# Patient Record
Sex: FEMALE | Race: WHITE | NOT HISPANIC OR LATINO | Employment: FULL TIME
[De-identification: names, ages, dates, MRNs, and addresses within clinical notes are randomized per-mention and may not be internally consistent; named-entity substitution may affect disease eponyms.]

---

## 2017-07-20 RX ORDER — SUMATRIPTAN 100 MG/1
TABLET, FILM COATED ORAL
Qty: 9 TABLET | Refills: 4 | Status: SHIPPED | OUTPATIENT
Start: 2017-07-20 | End: 2017-08-21

## 2017-07-20 RX ORDER — SUMATRIPTAN 100 MG/1
TABLET, FILM COATED ORAL
COMMUNITY
Start: 2006-12-21 | End: 2017-12-01

## 2017-08-21 RX ORDER — SUMATRIPTAN 100 MG/1
TABLET, FILM COATED ORAL
Qty: 9 TABLET | Refills: 1 | Status: SHIPPED | OUTPATIENT
Start: 2017-08-21 | End: 2017-11-06

## 2017-08-21 NOTE — TELEPHONE ENCOUNTER
[employer] is down for me- can not see last OV. Nothing documented in Epic.     Kristen Santiago, 08/21/17, 3:24 PM

## 2017-08-26 ENCOUNTER — HOSPITAL (OUTPATIENT)
Dept: OTHER | Age: 42
End: 2017-08-26
Attending: OBSTETRICS & GYNECOLOGY

## 2017-11-06 RX ORDER — SUMATRIPTAN 100 MG/1
TABLET, FILM COATED ORAL
Qty: 9 TABLET | Refills: 1 | Status: SHIPPED | OUTPATIENT
Start: 2017-11-06 | End: 2017-12-01

## 2017-11-08 NOTE — TELEPHONE ENCOUNTER
Physical made.    Your appointments     Date & Time Appointment Department [facility])    Nov 17, 2017 10:00 AM CST Physical - Established Patient with Terry Wellington MD [address] [place] [address]        Laura Albrecht

## 2017-12-01 ENCOUNTER — OFFICE VISIT (OUTPATIENT)
Dept: FAMILY MEDICINE CLINIC | Facility: CLINIC | Age: 42
End: 2017-12-01

## 2017-12-01 VITALS
HEIGHT: 63 IN | TEMPERATURE: 99 F | HEART RATE: 84 BPM | DIASTOLIC BLOOD PRESSURE: 62 MMHG | WEIGHT: 132 LBS | RESPIRATION RATE: 16 BRPM | SYSTOLIC BLOOD PRESSURE: 118 MMHG | BODY MASS INDEX: 23.39 KG/M2

## 2017-12-01 DIAGNOSIS — Z00.00 HEALTH CARE MAINTENANCE: Primary | ICD-10-CM

## 2017-12-01 DIAGNOSIS — G43.709 CHRONIC MIGRAINE WITHOUT AURA WITHOUT STATUS MIGRAINOSUS, NOT INTRACTABLE: ICD-10-CM

## 2017-12-01 PROCEDURE — 99396 PREV VISIT EST AGE 40-64: CPT | Performed by: FAMILY MEDICINE

## 2017-12-01 RX ORDER — SUMATRIPTAN 100 MG/1
TABLET, FILM COATED ORAL
Qty: 9 TABLET | Refills: 1 | Status: SHIPPED | OUTPATIENT
Start: 2017-12-01 | End: 2018-02-01

## 2017-12-01 NOTE — PATIENT INSTRUCTIONS
Schedule fasting labs, 12 hours water only  After labs  Get CT of head for eval of worsening migraines  If CT neg will begin metropolol daily for headache prevention  Refill meds

## 2017-12-02 NOTE — PROGRESS NOTES
[facility] [place]  PROGRESS NOTE  Chief Complaint:   Patient presents with:  Physical: no pap- sees gyne      HPI:   This is a 43year old female coming in for yearly physical.  Patient is not fasting today will come back for fasting labs.   The unfortunately she has resumed smoking a small amount again but this was prior to worsening of her headaches. Patient denies any chest pain patient denies any shortness of breath.   Patient denies any changes in the home environment they have had dogs for h stool.  MUSCULOSKELETAL:  Denies weakness, muscle aches, back pain, joint pain, swelling or stiffness. NEUROLOGICAL:  Denies  , seizures, dizziness, syncope, paralysis, , numbness or tingling in the extremities,change in bowel or bladder control.  SEE HPI normal reflexes. ASSESSMENT AND PLAN:   Felipe Barrera was seen today for physical.    Diagnoses and all orders for this visit:    Health care maintenance  -     COMP METABOLIC PANEL (14); Future  -     LIPID PANEL;  Future  -     CBC WITH DIFFERENTIAL WITH JAMILA complications, allergies, or worsening or changing symptoms. Patient is to call with any side effects or complications from the treatments as a result of today.      Problem List:  Patient Active Problem List:     Physical exam     Migraine      Elvia Hoar

## 2017-12-11 ENCOUNTER — LABORATORY ENCOUNTER (OUTPATIENT)
Dept: LAB | Age: 42
End: 2017-12-11
Attending: FAMILY MEDICINE
Payer: COMMERCIAL

## 2017-12-11 DIAGNOSIS — G43.709 CHRONIC MIGRAINE WITHOUT AURA WITHOUT STATUS MIGRAINOSUS, NOT INTRACTABLE: ICD-10-CM

## 2017-12-11 DIAGNOSIS — Z00.00 HEALTH CARE MAINTENANCE: ICD-10-CM

## 2017-12-11 PROCEDURE — 85025 COMPLETE CBC W/AUTO DIFF WBC: CPT

## 2017-12-11 PROCEDURE — 80061 LIPID PANEL: CPT

## 2017-12-11 PROCEDURE — 86140 C-REACTIVE PROTEIN: CPT

## 2017-12-11 PROCEDURE — 80053 COMPREHEN METABOLIC PANEL: CPT

## 2017-12-11 PROCEDURE — 36415 COLL VENOUS BLD VENIPUNCTURE: CPT

## 2017-12-11 PROCEDURE — 84443 ASSAY THYROID STIM HORMONE: CPT

## 2017-12-12 ENCOUNTER — TELEPHONE (OUTPATIENT)
Dept: FAMILY MEDICINE CLINIC | Facility: CLINIC | Age: 42
End: 2017-12-12

## 2017-12-12 NOTE — TELEPHONE ENCOUNTER
----- Message from Debo Berg MD sent at 12/11/2017  7:21 PM CST -----  Labs all good  No explanation for worsening of migraines over past few months  Recommend proceeding with CT scan of head, see if PA done, order had been placed   Will need labs forw

## 2017-12-13 ENCOUNTER — TELEPHONE (OUTPATIENT)
Dept: FAMILY MEDICINE CLINIC | Facility: CLINIC | Age: 42
End: 2017-12-13

## 2017-12-13 NOTE — TELEPHONE ENCOUNTER
Patient informed of the below results and recommendations. [address] [facility] [facility] is in her network so she will contact them to schedule an appt.   Order and labs faxed to [facility].

## 2017-12-18 ENCOUNTER — TELEPHONE (OUTPATIENT)
Dept: FAMILY MEDICINE CLINIC | Facility: CLINIC | Age: 42
End: 2017-12-18

## 2017-12-18 NOTE — TELEPHONE ENCOUNTER
Dr. Rosalinda Whitney wondering if pt had CT of brain done yet/has it scheduled. Left message for pt to return call.

## 2017-12-20 ENCOUNTER — HOSPITAL (OUTPATIENT)
Dept: OTHER | Age: 42
End: 2017-12-20
Attending: FAMILY MEDICINE

## 2017-12-20 NOTE — TELEPHONE ENCOUNTER
Call back from [facility]- pt did not have test done there.   Called pt- had CT done at [facility], will have to have call placed in AM.

## 2017-12-21 RX ORDER — METOPROLOL SUCCINATE 25 MG/1
12.5 TABLET, EXTENDED RELEASE ORAL DAILY
Qty: 30 TABLET | Refills: 6 | Status: SHIPPED | OUTPATIENT
Start: 2017-12-21 | End: 2018-12-07

## 2017-12-21 NOTE — TELEPHONE ENCOUNTER
Received results from [facility]. Per Dr. Courtney Potter is normal, recommend starting metoprolol 12.5 mg daily to decrease frequency of headaches. \"    Pt informed, [facility]  Unable to select Metoprolol Succinate 1

## 2018-02-01 RX ORDER — SUMATRIPTAN 100 MG/1
TABLET, FILM COATED ORAL
Qty: 9 TABLET | Refills: 1 | Status: SHIPPED | OUTPATIENT
Start: 2018-02-01 | End: 2018-07-12

## 2018-02-01 NOTE — TELEPHONE ENCOUNTER
Please advise refill. Received a faxed refill request from [facility] for sumatriptan 100mg. Last Rx 12/1/17.     Future appt:  None  Last Appointment:  12/1/2017    Cholesterol, Total (mg/dL)   Date Value   12/11/2017 147   ----------  HDL Choles

## 2018-07-12 RX ORDER — SUMATRIPTAN 100 MG/1
TABLET, FILM COATED ORAL
Qty: 9 TABLET | Refills: 1 | Status: SHIPPED | OUTPATIENT
Start: 2018-07-12 | End: 2018-12-07

## 2018-07-12 NOTE — TELEPHONE ENCOUNTER
Please advise refill of sumatriptan 100mg.   Last Rx: 2/1/18    Future appt:  None  Last Appointment:  12/1/17 - no f/u noted    Cholesterol, Total (mg/dL)   Date Value   12/11/2017 147   ----------  HDL Cholesterol (mg/dL)   Date Value   12/11/2017 52   --

## 2018-08-10 RX ORDER — SUMATRIPTAN 100 MG/1
TABLET, FILM COATED ORAL
Qty: 9 TABLET | Refills: 1 | Status: SHIPPED | OUTPATIENT
Start: 2018-08-10 | End: 2018-11-16

## 2018-08-10 NOTE — TELEPHONE ENCOUNTER
Please advise refill of Sumatriptan 100mg.   Last Rx: 7/12/18    Future appt:  None  Last Appointment:  12/1/17 for Physical, no f/u noted    Cholesterol, Total (mg/dL)   Date Value   12/11/2017 147   ----------  HDL Cholesterol (mg/dL)   Date Value   12/11

## 2018-10-08 ENCOUNTER — HOSPITAL (OUTPATIENT)
Dept: OTHER | Age: 43
End: 2018-10-08
Attending: OBSTETRICS & GYNECOLOGY

## 2018-10-14 ENCOUNTER — HOSPITAL (OUTPATIENT)
Dept: OTHER | Age: 43
End: 2018-10-14
Attending: EMERGENCY MEDICINE

## 2018-11-16 ENCOUNTER — TELEPHONE (OUTPATIENT)
Dept: FAMILY MEDICINE CLINIC | Facility: CLINIC | Age: 43
End: 2018-11-16

## 2018-11-16 RX ORDER — SUMATRIPTAN 100 MG/1
TABLET, FILM COATED ORAL
Qty: 9 TABLET | Refills: 1 | Status: SHIPPED | OUTPATIENT
Start: 2018-11-16 | End: 2018-12-07

## 2018-11-16 NOTE — TELEPHONE ENCOUNTER
Please advise refill of Sumatriptan 100mg. Last Rx: 8/10/18    Future appt:     Your appointments     Date & Time Appointment Department [facility])    Dec 07, 2018  2:00 PM CST Physical - Established Patient with Yolanda Rocha MD [address] [facility]

## 2018-12-07 ENCOUNTER — OFFICE VISIT (OUTPATIENT)
Dept: FAMILY MEDICINE CLINIC | Facility: CLINIC | Age: 43
End: 2018-12-07
Payer: COMMERCIAL

## 2018-12-07 VITALS
BODY MASS INDEX: 25.12 KG/M2 | TEMPERATURE: 98 F | DIASTOLIC BLOOD PRESSURE: 68 MMHG | WEIGHT: 140 LBS | HEIGHT: 62.75 IN | RESPIRATION RATE: 18 BRPM | SYSTOLIC BLOOD PRESSURE: 118 MMHG

## 2018-12-07 DIAGNOSIS — G43.709 CHRONIC MIGRAINE WITHOUT AURA WITHOUT STATUS MIGRAINOSUS, NOT INTRACTABLE: ICD-10-CM

## 2018-12-07 DIAGNOSIS — Z00.00 PHYSICAL EXAM: Primary | ICD-10-CM

## 2018-12-07 DIAGNOSIS — R63.5 WEIGHT GAIN: ICD-10-CM

## 2018-12-07 PROCEDURE — 99396 PREV VISIT EST AGE 40-64: CPT | Performed by: FAMILY MEDICINE

## 2018-12-07 PROCEDURE — 90715 TDAP VACCINE 7 YRS/> IM: CPT | Performed by: FAMILY MEDICINE

## 2018-12-07 PROCEDURE — 90471 IMMUNIZATION ADMIN: CPT | Performed by: FAMILY MEDICINE

## 2018-12-07 RX ORDER — MIRABEGRON 50 MG/1
TABLET, FILM COATED, EXTENDED RELEASE ORAL
COMMUNITY
Start: 2018-10-23 | End: 2019-12-11 | Stop reason: ALTCHOICE

## 2018-12-07 RX ORDER — SUMATRIPTAN 100 MG/1
TABLET, FILM COATED ORAL
Qty: 9 TABLET | Refills: 2 | Status: SHIPPED | OUTPATIENT
Start: 2018-12-07 | End: 2019-07-08

## 2018-12-10 NOTE — PROGRESS NOTES
[facility] [place]  PROGRESS NOTE  Chief Complaint:   Patient presents with:  Physical      HPI:   This is a 37year old female coming in for yearly physical.  Patient is pleased that she has been able to quit smoking this year but is displeased C-REACTIVE PROTEIN   Result Value Ref Range    C-Reactive Protein <0.29 <1.00 mg/dL   CBC W/ DIFFERENTIAL   Result Value Ref Range    WBC 6.7 4.0 - 13.0 x10(3) uL    RBC 4.11 3.80 - 5.10 x10(6)uL    HGB 13.1 12.0 - 16.0 g/dL    HCT 39.9 34.0 - 50.0 % Answered  Comment: 5-10 cigarrettes per day        REVIEW OF SYSTEMS:   CONSTITUTIONAL:  Denies  , fever, chills, or fatigue. EENT:  Eyes:  Denies eye pain, visual loss, blurred vision, double vision or yellow sclerae.  Ears, Nose, Throat:  Denies hearing patent, no nasal discharge Throat:  No tonsillar erythema or exudate. Mouth:  No oral lesions or ulcerations, good dentition. NECK: Supple, , no JVD, no thyromegaly. SKIN: No rashes, no skin lesion, no bruising, good turgor.   HEART:  Regular rate and rh education done. Outcome: Patient verbalizes understanding. Patient is notified to call with any questions, complications, allergies, or worsening or changing symptoms.   Patient is to call with any side effects or complications from the treatments as a re

## 2018-12-17 ENCOUNTER — LABORATORY ENCOUNTER (OUTPATIENT)
Dept: LAB | Age: 43
End: 2018-12-17
Attending: FAMILY MEDICINE
Payer: COMMERCIAL

## 2018-12-17 DIAGNOSIS — Z00.00 PHYSICAL EXAM: ICD-10-CM

## 2018-12-17 DIAGNOSIS — R63.5 WEIGHT GAIN: ICD-10-CM

## 2018-12-17 PROCEDURE — 80053 COMPREHEN METABOLIC PANEL: CPT

## 2018-12-17 PROCEDURE — 84443 ASSAY THYROID STIM HORMONE: CPT

## 2018-12-17 PROCEDURE — 84439 ASSAY OF FREE THYROXINE: CPT

## 2018-12-17 PROCEDURE — 85025 COMPLETE CBC W/AUTO DIFF WBC: CPT

## 2018-12-17 PROCEDURE — 36415 COLL VENOUS BLD VENIPUNCTURE: CPT

## 2018-12-17 PROCEDURE — 80061 LIPID PANEL: CPT

## 2018-12-18 ENCOUNTER — TELEPHONE (OUTPATIENT)
Dept: FAMILY MEDICINE CLINIC | Facility: CLINIC | Age: 43
End: 2018-12-18

## 2018-12-18 NOTE — TELEPHONE ENCOUNTER
----- Message from Debo Berg MD sent at 12/17/2018  7:57 PM CST -----  Labs all very good including chol, sugar, liver and thyroid studies

## 2019-04-12 ENCOUNTER — HOSPITAL (OUTPATIENT)
Dept: OTHER | Age: 44
End: 2019-04-12
Attending: OBSTETRICS & GYNECOLOGY

## 2019-05-02 ENCOUNTER — HOSPITAL (OUTPATIENT)
Dept: OTHER | Age: 44
End: 2019-05-02
Attending: OBSTETRICS & GYNECOLOGY

## 2019-06-06 ENCOUNTER — HOSPITAL (OUTPATIENT)
Dept: OTHER | Age: 44
End: 2019-06-06
Attending: OBSTETRICS & GYNECOLOGY

## 2019-07-08 RX ORDER — SUMATRIPTAN 100 MG/1
TABLET, FILM COATED ORAL
Qty: 9 TABLET | Refills: 2 | Status: SHIPPED | OUTPATIENT
Start: 2019-07-08 | End: 2019-07-15

## 2019-07-08 NOTE — TELEPHONE ENCOUNTER
Received a fax requesting refill for sumatriptan.     Future appt:    Last Appointment with Provider:  12/7/2018    Last Appointment at [facility]:  Visit date not found    Lab Results   Component Value Date     12/17/2018    K 3.8 12/17/2018    BUN

## 2019-07-15 RX ORDER — SUMATRIPTAN 100 MG/1
TABLET, FILM COATED ORAL
Qty: 9 TABLET | Refills: 2 | Status: SHIPPED | OUTPATIENT
Start: 2019-07-15 | End: 2019-12-10

## 2019-07-15 NOTE — TELEPHONE ENCOUNTER
Future appt:    Last Appointment:   Last px on file: 12/17/18     Sumatriptan refilled on 7/8/19    Received request from [address] Spoke with pt , confirmed that they are now using mail order pharmacy.       Cholesterol, Total (mg/dL)   Date

## 2019-12-10 RX ORDER — SUMATRIPTAN 100 MG/1
TABLET, FILM COATED ORAL
Qty: 9 TABLET | Refills: 2 | Status: SHIPPED | OUTPATIENT
Start: 2019-12-10 | End: 2020-03-16

## 2019-12-10 NOTE — TELEPHONE ENCOUNTER
Future appt:     Your appointments     Date & Time Appointment Department [facility])    Dec 11, 2019  4:00 PM CST Physical - Established with Israel Moise, [facility] ([facility])            Reyes Blank

## 2019-12-11 ENCOUNTER — HOSPITAL (OUTPATIENT)
Dept: OTHER | Age: 44
End: 2019-12-11
Attending: OBSTETRICS & GYNECOLOGY

## 2019-12-11 ENCOUNTER — OFFICE VISIT (OUTPATIENT)
Dept: FAMILY MEDICINE CLINIC | Facility: CLINIC | Age: 44
End: 2019-12-11
Payer: COMMERCIAL

## 2019-12-11 VITALS
TEMPERATURE: 97 F | WEIGHT: 137 LBS | BODY MASS INDEX: 24.58 KG/M2 | DIASTOLIC BLOOD PRESSURE: 66 MMHG | SYSTOLIC BLOOD PRESSURE: 106 MMHG | RESPIRATION RATE: 18 BRPM | HEART RATE: 67 BPM | HEIGHT: 62.75 IN

## 2019-12-11 DIAGNOSIS — Z13.0 SCREENING FOR DEFICIENCY ANEMIA: ICD-10-CM

## 2019-12-11 DIAGNOSIS — Z13.220 LIPID SCREENING: ICD-10-CM

## 2019-12-11 DIAGNOSIS — Z00.00 WELLNESS EXAMINATION: Primary | ICD-10-CM

## 2019-12-11 DIAGNOSIS — Z13.29 THYROID DISORDER SCREEN: ICD-10-CM

## 2019-12-11 PROCEDURE — 99396 PREV VISIT EST AGE 40-64: CPT | Performed by: FAMILY MEDICINE

## 2019-12-11 NOTE — PATIENT INSTRUCTIONS
Continue current medications  Migraine stable with medications. Pap smear and mammogram done recently at [facility].   Recommend fasting labs at [facility].   Return to clinic if any concern.

## 2019-12-11 NOTE — PROGRESS NOTES
[address] [place] [address]    Chief Complaint:   Patient presents with:  Physical      HPI:     Martha Bassett is a 40year old female who presents for an Annual Health Visit.     Patient has history of migraine which is currently stable with medic frequency; no hx abnormal pap smear; no vaginal discharge; periods regular; no urinary incontinence  MUSCULOSKELETAL: no joint complaints upper or lower extremities  NEURO: no sensory or motor complaint  PSYCHE: no symptoms of depression or anxiety  HEMATO REFLEX TO FREE T4    Lipid screening  -     LIPID PANEL    Screening for deficiency anemia  -     CBC WITH DIFFERENTIAL WITH PLATELET    Thyroid disorder screen  -     TSH W REFLEX TO FREE T4        Patient Instructions   Continue current medications  Migr

## 2019-12-18 ENCOUNTER — TELEPHONE (OUTPATIENT)
Dept: FAMILY MEDICINE CLINIC | Facility: CLINIC | Age: 44
End: 2019-12-18

## 2019-12-18 NOTE — TELEPHONE ENCOUNTER
----- Message from Yash Diop MD sent at 12/17/2019  8:28 PM CST -----  Please inform patient that her LDL cholesterol is very slightly elevated at 104.   Rest of her lipid panel and rest of labs are normal.  Recommend low-cholesterol diet and exercise a

## 2020-03-16 RX ORDER — SUMATRIPTAN 100 MG/1
TABLET, FILM COATED ORAL
Qty: 9 TABLET | Refills: 0 | Status: SHIPPED | OUTPATIENT
Start: 2020-03-16 | End: 2020-05-07

## 2020-03-16 NOTE — TELEPHONE ENCOUNTER
Future appt:    Last Appointment with provider:   12/11/2019  Last appointment at [facility]:  12/11/2019  CHOLESTEROL, TOTAL (mg/dL)   Date Value   12/16/2019 170     HDL CHOLESTEROL (mg/dL)   Date Value   12/16/2019 51     LDL-CHOLESTEROL (mg/dL (calc)

## 2020-05-07 RX ORDER — SUMATRIPTAN 100 MG/1
TABLET, FILM COATED ORAL
Qty: 9 TABLET | Refills: 2 | Status: SHIPPED | OUTPATIENT
Start: 2020-05-07 | End: 2020-08-28

## 2020-05-07 NOTE — TELEPHONE ENCOUNTER
Future appt:    Last Appointment with provider:   12/11/2019  Last appointment at [facility]:  12/11/2019  CHOLESTEROL, TOTAL (mg/dL)   Date Value   12/16/2019 170     HDL CHOLESTEROL (mg/dL)   Date Value   12/16/2019 51     LDL-CHOLESTEROL (mg/dL (calc)

## 2020-08-28 RX ORDER — SUMATRIPTAN 100 MG/1
TABLET, FILM COATED ORAL
Qty: 9 TABLET | Refills: 1 | Status: SHIPPED | OUTPATIENT
Start: 2020-08-28 | End: 2020-11-05

## 2020-08-28 NOTE — TELEPHONE ENCOUNTER
Future appt:    Last Appointment with provider:   Visit date not found  Last appointment at [facility]:  Visit date not found  CHOLESTEROL, TOTAL (mg/dL)   Date Value   12/16/2019 170     HDL CHOLESTEROL (mg/dL)   Date Value   12/16/2019 51     LDL-TARUN

## 2020-10-29 ENCOUNTER — HOSPITAL (OUTPATIENT)
Dept: OTHER | Age: 45
End: 2020-10-29
Attending: OBSTETRICS & GYNECOLOGY

## 2020-11-05 RX ORDER — SUMATRIPTAN 100 MG/1
TABLET, FILM COATED ORAL
Qty: 9 TABLET | Refills: 0 | Status: SHIPPED | OUTPATIENT
Start: 2020-11-05 | End: 2020-11-24

## 2020-11-05 NOTE — TELEPHONE ENCOUNTER
Pt is due for appointment. Patient is due for labs:     Please have pt  schedule apptointment according to my follow up guidelines. Refills until appointmentt ok.

## 2020-11-05 NOTE — TELEPHONE ENCOUNTER
Please advise refill of Sumatriptan 100mg.   Last Rx: 8/28/20    Future appt:    Last Appointment with provider:   12/11/19 - Physical per notes return for Wellness visit    Last appointment at [facility]:  Visit date not found  CHOLESTEROL, TOTAL (mg/dL)

## 2020-11-24 RX ORDER — SUMATRIPTAN 100 MG/1
TABLET, FILM COATED ORAL
Qty: 9 TABLET | Refills: 0 | Status: SHIPPED | OUTPATIENT
Start: 2020-11-24 | End: 2020-12-02

## 2020-11-25 NOTE — TELEPHONE ENCOUNTER
Future appt:     Your appointments     Date & Time Appointment Department [facility])    Dec 17, 2020 11:00 AM CST Physical - Established with Claudell Public, [address] [place] [address] Venkatesh Davalos ([place])

## 2020-12-02 RX ORDER — SUMATRIPTAN 100 MG/1
TABLET, FILM COATED ORAL
Qty: 9 TABLET | Refills: 0 | Status: SHIPPED | OUTPATIENT
Start: 2020-12-02 | End: 2020-12-17

## 2020-12-02 NOTE — TELEPHONE ENCOUNTER
Future appt:     Your appointments     Date & Time Appointment Department [facility])    Dec 17, 2020 11:00 AM CST Physical - Established with Dori Krueger, [address] [facility] [place] ([facility]            Janice Hart

## 2020-12-17 ENCOUNTER — HOSPITAL ENCOUNTER (OUTPATIENT)
Dept: MAMMOGRAPHY | Age: 45
Discharge: HOME | End: 2020-12-17
Attending: FAMILY MEDICINE

## 2020-12-17 ENCOUNTER — OFFICE VISIT (OUTPATIENT)
Dept: FAMILY MEDICINE CLINIC | Facility: CLINIC | Age: 45
End: 2020-12-17
Payer: COMMERCIAL

## 2020-12-17 VITALS
TEMPERATURE: 99 F | BODY MASS INDEX: 24.53 KG/M2 | RESPIRATION RATE: 16 BRPM | HEIGHT: 62.27 IN | DIASTOLIC BLOOD PRESSURE: 58 MMHG | OXYGEN SATURATION: 98 % | SYSTOLIC BLOOD PRESSURE: 110 MMHG | HEART RATE: 64 BPM | WEIGHT: 135 LBS

## 2020-12-17 DIAGNOSIS — Z00.00 PHYSICAL EXAM: Primary | ICD-10-CM

## 2020-12-17 DIAGNOSIS — Z12.31 OTHER SCREENING MAMMOGRAM: ICD-10-CM

## 2020-12-17 PROCEDURE — 3008F BODY MASS INDEX DOCD: CPT | Performed by: FAMILY MEDICINE

## 2020-12-17 PROCEDURE — 3078F DIAST BP <80 MM HG: CPT | Performed by: FAMILY MEDICINE

## 2020-12-17 PROCEDURE — 3074F SYST BP LT 130 MM HG: CPT | Performed by: FAMILY MEDICINE

## 2020-12-17 PROCEDURE — 77067 SCR MAMMO BI INCL CAD: CPT

## 2020-12-17 PROCEDURE — 99396 PREV VISIT EST AGE 40-64: CPT | Performed by: FAMILY MEDICINE

## 2020-12-17 RX ORDER — SUMATRIPTAN 100 MG/1
TABLET, FILM COATED ORAL
Qty: 9 TABLET | Refills: 5 | Status: SHIPPED | OUTPATIENT
Start: 2020-12-17 | End: 2021-07-17

## 2020-12-17 NOTE — PATIENT INSTRUCTIONS
Continue current medications for migraine. Declined flu shot.    Had mammogram done at [facility].   Fasting labs at [facility].

## 2020-12-18 NOTE — PROGRESS NOTES
[address]    Chief Complaint:   Patient presents with:  Physical: yearly      HPI:     Aline Bennett is a 39year old female who presents for an Annual Health Visit.     Patient had mammogram done today, also had Pap smear done co periods regular; no urinary incontinence  MUSCULOSKELETAL: no joint complaints upper or lower extremities  NEURO: no sensory or motor complaint  PSYCHE: no symptoms of depression or anxiety  HEMATOLOGY: denies hx anemia; denies bruising or excessive bleedi SUMAtriptan Succinate 100 MG Oral Tab; TAKE 1 TABLET AT ONSET OF MIGRAINE, MAY REPEAT AFTER 2 HOURS. NOT TO EXCEED 2 DOSES IN 24 HOURS        Patient Instructions   Continue current medications for migraine. Declined flu shot.    Had mammogram done at

## 2021-01-09 ENCOUNTER — TELEPHONE (OUTPATIENT)
Dept: FAMILY MEDICINE CLINIC | Facility: CLINIC | Age: 46
End: 2021-01-09

## 2021-01-09 DIAGNOSIS — R31.9 HEMATURIA, UNSPECIFIED TYPE: Primary | ICD-10-CM

## 2021-01-09 LAB
ABSOLUTE BASOPHILS: 41 CELLS/UL (ref 0–200)
ABSOLUTE EOSINOPHILS: 183 CELLS/UL (ref 15–500)
ABSOLUTE LYMPHOCYTES: 1711 CELLS/UL (ref 850–3900)
ABSOLUTE MONOCYTES: 578 CELLS/UL (ref 200–950)
ABSOLUTE NEUTROPHILS: 3387 CELLS/UL (ref 1500–7800)
ALBUMIN/GLOBULIN RATIO: 1.9 (CALC) (ref 1–2.5)
ALBUMIN: 4.5 G/DL (ref 3.6–5.1)
ALKALINE PHOSPHATASE: 58 U/L (ref 31–125)
ALT: 28 U/L (ref 6–29)
APPEARANCE: CLEAR
AST: 20 U/L (ref 10–35)
BASOPHILS: 0.7 %
BILIRUBIN, TOTAL: 0.5 MG/DL (ref 0.2–1.2)
BILIRUBIN: NEGATIVE
BUN: 18 MG/DL (ref 7–25)
CALCIUM: 9.6 MG/DL (ref 8.6–10.2)
CARBON DIOXIDE: 27 MMOL/L (ref 20–32)
CHLORIDE: 106 MMOL/L (ref 98–110)
CHOL/HDLC RATIO: 3.2 (CALC)
CHOLESTEROL, TOTAL: 178 MG/DL
CREATININE: 0.69 MG/DL (ref 0.5–1.1)
EGFR IF AFRICN AM: 122 ML/MIN/1.73M2
EGFR IF NONAFRICN AM: 105 ML/MIN/1.73M2
EOSINOPHILS: 3.1 %
GLOBULIN: 2.4 G/DL (CALC) (ref 1.9–3.7)
GLUCOSE: 92 MG/DL (ref 65–99)
GLUCOSE: NEGATIVE
HDL CHOLESTEROL: 56 MG/DL
HEMATOCRIT: 38.5 % (ref 35–45)
HEMOGLOBIN A1C: 4.9 % OF TOTAL HGB
HEMOGLOBIN: 13.1 G/DL (ref 11.7–15.5)
KETONES: NEGATIVE
LDL-CHOLESTEROL: 107 MG/DL (CALC)
LEUKOCYTE ESTERASE: NEGATIVE
LYMPHOCYTES: 29 %
MCH: 32 PG (ref 27–33)
MCHC: 34 G/DL (ref 32–36)
MCV: 94.1 FL (ref 80–100)
MONOCYTES: 9.8 %
MPV: 9.9 FL (ref 7.5–12.5)
NEUTROPHILS: 57.4 %
NITRITE: NEGATIVE
NON-HDL CHOLESTEROL: 122 MG/DL (CALC)
PH: 7 (ref 5–8)
PLATELET COUNT: 276 THOUSAND/UL (ref 140–400)
POTASSIUM: 4.3 MMOL/L (ref 3.5–5.3)
PROTEIN, TOTAL: 6.9 G/DL (ref 6.1–8.1)
PROTEIN: NEGATIVE
RDW: 11.5 % (ref 11–15)
RED BLOOD CELL COUNT: 4.09 MILLION/UL (ref 3.8–5.1)
SODIUM: 139 MMOL/L (ref 135–146)
SPECIFIC GRAVITY: 1.02 (ref 1–1.03)
TRIGLYCERIDES: 66 MG/DL
TSH W/REFLEX TO FT4: 0.77 MIU/L
WHITE BLOOD CELL COUNT: 5.9 THOUSAND/UL (ref 3.8–10.8)

## 2021-01-09 NOTE — TELEPHONE ENCOUNTER
----- Message from Johanny Light MD sent at 1/9/2021 10:06 AM CST -----  Please inform patient that her lipid panel is normal except slightly elevated LDL cholesterol at 107. Her urinalysis also shows that she has microscopic blood in her urine.   No sign

## 2021-01-09 NOTE — TELEPHONE ENCOUNTER
Patient notified of lab results and Dr. Sanju Larios instructions.  Lab order for repeat u/a faxed to [facility].

## 2021-01-13 ENCOUNTER — HOSPITAL ENCOUNTER (OUTPATIENT)
Dept: ULTRASOUND IMAGING | Age: 46
Discharge: HOME | End: 2021-01-13
Attending: OBSTETRICS & GYNECOLOGY

## 2021-01-13 ENCOUNTER — HOSPITAL ENCOUNTER (OUTPATIENT)
Dept: MAMMOGRAPHY | Age: 46
Discharge: HOME | End: 2021-01-13
Attending: OBSTETRICS & GYNECOLOGY

## 2021-01-13 DIAGNOSIS — R92.8 ABNORMAL MAMMOGRAM: ICD-10-CM

## 2021-01-13 PROCEDURE — 76641 ULTRASOUND BREAST COMPLETE: CPT

## 2021-01-13 PROCEDURE — G0279 TOMOSYNTHESIS, MAMMO: HCPCS

## 2021-01-29 ENCOUNTER — TELEPHONE (OUTPATIENT)
Dept: CT IMAGING | Age: 46
End: 2021-01-29

## 2021-02-01 ENCOUNTER — HOSPITAL ENCOUNTER (OUTPATIENT)
Dept: MAMMOGRAPHY | Age: 46
Discharge: HOME | End: 2021-02-01
Attending: OBSTETRICS & GYNECOLOGY

## 2021-02-01 VITALS — OXYGEN SATURATION: 98 % | HEART RATE: 77 BPM | DIASTOLIC BLOOD PRESSURE: 56 MMHG | SYSTOLIC BLOOD PRESSURE: 114 MMHG

## 2021-02-01 DIAGNOSIS — R92.8 ABNORMAL FINDING ON BREAST IMAGING: ICD-10-CM

## 2021-02-01 DIAGNOSIS — R92.0 MAMMOGRAPHIC MICROCALCIFICATION: ICD-10-CM

## 2021-02-01 PROCEDURE — 10002801 HB RX 250 W/O HCPCS

## 2021-02-01 PROCEDURE — A4648 IMPLANTABLE TISSUE MARKER: HCPCS

## 2021-02-01 PROCEDURE — 19081 BX BREAST 1ST LESION STRTCTC: CPT

## 2021-02-01 PROCEDURE — 10006023 HB SUPPLY 272

## 2021-02-01 PROCEDURE — 88305 TISSUE EXAM BY PATHOLOGIST: CPT | Performed by: OBSTETRICS & GYNECOLOGY

## 2021-02-01 PROCEDURE — 10006027 HB SUPPLY 278

## 2021-02-01 RX ORDER — LIDOCAINE HYDROCHLORIDE 20 MG/ML
INJECTION, SOLUTION INFILTRATION; PERINEURAL PRN
Status: COMPLETED | OUTPATIENT
Start: 2021-02-01 | End: 2021-02-01

## 2021-02-01 RX ADMIN — LIDOCAINE HYDROCHLORIDE 10 ML: 20 INJECTION, SOLUTION INFILTRATION; PERINEURAL at 13:43

## 2021-02-02 ENCOUNTER — TELEPHONE (OUTPATIENT)
Dept: MAMMOGRAPHY | Age: 46
End: 2021-02-02

## 2021-02-03 LAB
ASR DISCLAIMER: NORMAL
CASE RPRT: NORMAL
CLINICAL INFO: NORMAL
PATH REPORT.FINAL DX SPEC: NORMAL
PATH REPORT.FINAL DX SPEC: NORMAL
PATH REPORT.GROSS SPEC: NORMAL

## 2021-02-11 ENCOUNTER — TELEPHONE (OUTPATIENT)
Dept: FAMILY MEDICINE CLINIC | Facility: CLINIC | Age: 46
End: 2021-02-11

## 2021-02-11 LAB
APPEARANCE: CLEAR
BILIRUBIN: NEGATIVE
COLOR: YELLOW
GLUCOSE: NEGATIVE
KETONES: NEGATIVE
LEUKOCYTE ESTERASE: NEGATIVE
NITRITE: NEGATIVE
OCCULT BLOOD: NEGATIVE
PH: 7.5 (ref 5–8)
PROTEIN: NEGATIVE
SPECIFIC GRAVITY: 1.01 (ref 1–1.03)

## 2021-02-11 NOTE — TELEPHONE ENCOUNTER
----- Message from Faby Hu MD sent at 2/11/2021 11:58 AM CST -----  Please inform patient that her urine analysis is normal, her hematuria has improved. Recommend to continue with plenty of fluid.

## 2021-02-11 NOTE — TELEPHONE ENCOUNTER
Pt was informed of the below results. Pt states that she understands.  Pt does not have any other question at this time

## 2021-03-03 ENCOUNTER — TELEPHONE (OUTPATIENT)
Dept: FAMILY MEDICINE CLINIC | Facility: CLINIC | Age: 46
End: 2021-03-03

## 2021-03-30 ENCOUNTER — OFFICE VISIT (OUTPATIENT)
Dept: FAMILY MEDICINE CLINIC | Facility: CLINIC | Age: 46
End: 2021-03-30
Payer: COMMERCIAL

## 2021-03-30 VITALS
WEIGHT: 139.38 LBS | DIASTOLIC BLOOD PRESSURE: 62 MMHG | TEMPERATURE: 97 F | BODY MASS INDEX: 23.79 KG/M2 | OXYGEN SATURATION: 99 % | HEIGHT: 64 IN | RESPIRATION RATE: 18 BRPM | HEART RATE: 71 BPM | SYSTOLIC BLOOD PRESSURE: 118 MMHG

## 2021-03-30 DIAGNOSIS — Z01.818 PREOPERATIVE EXAMINATION: Primary | ICD-10-CM

## 2021-03-30 DIAGNOSIS — N39.3 STRESS INCONTINENCE OF URINE: ICD-10-CM

## 2021-03-30 DIAGNOSIS — L30.9 DERMATITIS: ICD-10-CM

## 2021-03-30 PROCEDURE — 99214 OFFICE O/P EST MOD 30 MIN: CPT | Performed by: FAMILY MEDICINE

## 2021-03-30 PROCEDURE — 3074F SYST BP LT 130 MM HG: CPT | Performed by: FAMILY MEDICINE

## 2021-03-30 PROCEDURE — 3008F BODY MASS INDEX DOCD: CPT | Performed by: FAMILY MEDICINE

## 2021-03-30 PROCEDURE — 3078F DIAST BP <80 MM HG: CPT | Performed by: FAMILY MEDICINE

## 2021-03-30 PROCEDURE — 93000 ELECTROCARDIOGRAM COMPLETE: CPT | Performed by: FAMILY MEDICINE

## 2021-03-30 RX ORDER — TRIAMCINOLONE ACETONIDE 0.1 %
1 PASTE (GRAM) DENTAL 2 TIMES DAILY
Qty: 1 TUBE | Refills: 1 | Status: SHIPPED | OUTPATIENT
Start: 2021-03-30

## 2021-03-30 RX ORDER — SUMATRIPTAN 100 MG/1
100 TABLET, FILM COATED ORAL PRN
COMMUNITY

## 2021-03-30 NOTE — PROGRESS NOTES
[facility] [place]  PRE-OP NOTE    Chief Complaint:   Patient presents with:  Pre-Op Exam      HPI:   Medina Lott is a 39year old female with a hx of urine stress incontinence, who presents for a pre-operative physical exam. Patient is to Eyes:  Denies eye pain, visual loss, blurred vision, double vision or yellow sclerae. Ears, Nose, Throat:  Denies hearing loss, sneezing, congestion, runny nose or sore throat.   INTEGUMENTARY:   See HPI  CARDIOVASCULAR:  Denies chest pain, chest pressure, erythema or exudate. Mouth:  No oral lesions or ulcerations, good dentition. NECK: Supple, no CLAD, no JVD, no carotid bruit, no thyromegaly.   SKIN: Small patch of erythematous, slightly raised rash over bottom of lower lip, nontender, no drainage, no wa verbalizes understanding. Patient is notified to call with any questions, complications, allergies, or worsening or changing symptoms. Patient is to call with any side effects or complications from the treatments as a result of today.      Problem List:  P

## 2021-03-30 NOTE — PATIENT INSTRUCTIONS
EKG shows sinus bradycardia. Labs done at [address] [place] [address].  After today's assessment  patient is at optimum health for surgery and relatively at low risk. There are no contraindication for procedure.    Recommend to avoid any use of aleve, aspirin or ibuprofen 1 wee

## 2021-04-02 ENCOUNTER — LAB SERVICES (OUTPATIENT)
Dept: LAB | Age: 46
End: 2021-04-02
Attending: UROLOGY

## 2021-04-02 DIAGNOSIS — Z01.812 PRE-PROCEDURAL LABORATORY EXAMINATION: ICD-10-CM

## 2021-04-02 LAB
ABO + RH BLD: NORMAL
APPEARANCE UR: CLEAR
BILIRUB UR QL STRIP: NEGATIVE
BLD GP AB SCN SERPL QL GEL: NEGATIVE
COLOR UR: YELLOW
GLUCOSE UR STRIP-MCNC: NEGATIVE MG/DL
HCT VFR BLD CALC: 40.8 % (ref 36–46.5)
HGB BLD-MCNC: 13.5 G/DL (ref 12–15.5)
HGB UR QL STRIP: NEGATIVE
KETONES UR STRIP-MCNC: NEGATIVE MG/DL
LEUKOCYTE ESTERASE UR QL STRIP: NEGATIVE
NITRITE UR QL STRIP: NEGATIVE
PH UR STRIP: 7 [PH] (ref 5–7)
PROT UR STRIP-MCNC: NEGATIVE MG/DL
SP GR UR STRIP: 1.01 (ref 1–1.03)
TYPE AND SCREEN EXPIRATION DATE: NORMAL
UROBILINOGEN UR STRIP-MCNC: 0.2 MG/DL

## 2021-04-02 PROCEDURE — 85018 HEMOGLOBIN: CPT

## 2021-04-02 PROCEDURE — 86901 BLOOD TYPING SEROLOGIC RH(D): CPT

## 2021-04-02 PROCEDURE — U0003 INFECTIOUS AGENT DETECTION BY NUCLEIC ACID (DNA OR RNA); SEVERE ACUTE RESPIRATORY SYNDROME CORONAVIRUS 2 (SARS-COV-2) (CORONAVIRUS DISEASE [COVID-19]), AMPLIFIED PROBE TECHNIQUE, MAKING USE OF HIGH THROUGHPUT TECHNOLOGIES AS DESCRIBED BY CMS-2020-01-R: HCPCS

## 2021-04-02 PROCEDURE — 81003 URINALYSIS AUTO W/O SCOPE: CPT

## 2021-04-03 LAB
SARS-COV-2 RNA RESP QL NAA+PROBE: NOT DETECTED
SERVICE CMNT-IMP: NORMAL
SERVICE CMNT-IMP: NORMAL

## 2021-04-05 ENCOUNTER — HOSPITAL ENCOUNTER (OUTPATIENT)
Age: 46
Setting detail: OBSERVATION
Discharge: HOME | End: 2021-04-06
Attending: UROLOGY | Admitting: OBSTETRICS & GYNECOLOGY

## 2021-04-05 ENCOUNTER — ANESTHESIA EVENT (OUTPATIENT)
Dept: SURGERY | Age: 46
End: 2021-04-05

## 2021-04-05 ENCOUNTER — ANESTHESIA (OUTPATIENT)
Dept: SURGERY | Age: 46
End: 2021-04-05

## 2021-04-05 DIAGNOSIS — N39.3 FEMALE STRESS INCONTINENCE: ICD-10-CM

## 2021-04-05 DIAGNOSIS — Z01.812 PRE-PROCEDURAL LABORATORY EXAMINATION: Primary | ICD-10-CM

## 2021-04-05 LAB
B-HCG UR QL: NEGATIVE
INTERNAL PROCEDURAL CONTROLS ACCEPTABLE: YES

## 2021-04-05 PROCEDURE — 96374 THER/PROPH/DIAG INJ IV PUSH: CPT

## 2021-04-05 PROCEDURE — 13000037 HB COMPLEX CASE EACH ADD MINUTE: Performed by: UROLOGY

## 2021-04-05 PROCEDURE — 10004651 HB RX, NO CHARGE ITEM: Performed by: OBSTETRICS & GYNECOLOGY

## 2021-04-05 PROCEDURE — 10002807 HB RX 258: Performed by: NURSE ANESTHETIST, CERTIFIED REGISTERED

## 2021-04-05 PROCEDURE — 13000003 HB ANESTHESIA  GENERAL EA ADD MINUTE: Performed by: UROLOGY

## 2021-04-05 PROCEDURE — 10002800 HB RX 250 W HCPCS: Performed by: NURSE ANESTHETIST, CERTIFIED REGISTERED

## 2021-04-05 PROCEDURE — 10004452 HB PACU ADDL 30 MINUTES: Performed by: UROLOGY

## 2021-04-05 PROCEDURE — 10004451 HB PACU RECOVERY 1ST 30 MINUTES: Performed by: UROLOGY

## 2021-04-05 PROCEDURE — 10004651 HB RX, NO CHARGE ITEM: Performed by: UROLOGY

## 2021-04-05 PROCEDURE — 10002803 HB RX 637: Performed by: OBSTETRICS & GYNECOLOGY

## 2021-04-05 PROCEDURE — 10002801 HB RX 250 W/O HCPCS: Performed by: NURSE ANESTHETIST, CERTIFIED REGISTERED

## 2021-04-05 PROCEDURE — 10002803 HB RX 637

## 2021-04-05 PROCEDURE — C1771 REP DEV, URINARY, W/SLING: HCPCS | Performed by: UROLOGY

## 2021-04-05 PROCEDURE — 81025 URINE PREGNANCY TEST: CPT | Performed by: UROLOGY

## 2021-04-05 PROCEDURE — 10002801 HB RX 250 W/O HCPCS: Performed by: OBSTETRICS & GYNECOLOGY

## 2021-04-05 PROCEDURE — 88307 TISSUE EXAM BY PATHOLOGIST: CPT | Performed by: UROLOGY

## 2021-04-05 PROCEDURE — G0378 HOSPITAL OBSERVATION PER HR: HCPCS

## 2021-04-05 PROCEDURE — 10006023 HB SUPPLY 272: Performed by: UROLOGY

## 2021-04-05 PROCEDURE — 13000036 HB COMPLEX  CASE S/U + 1ST 15 MIN: Performed by: UROLOGY

## 2021-04-05 PROCEDURE — 10006027 HB SUPPLY 278: Performed by: UROLOGY

## 2021-04-05 PROCEDURE — 13000002 HB ANESTHESIA  GENERAL  S/U + 1ST 15 MIN: Performed by: UROLOGY

## 2021-04-05 PROCEDURE — 10002800 HB RX 250 W HCPCS: Performed by: OBSTETRICS & GYNECOLOGY

## 2021-04-05 DEVICE — TRANSOBTURATOR SLING SYSTEM WITH PRECISIONBLUE™ DESIGN
Type: IMPLANTABLE DEVICE | Site: VAGINA | Status: FUNCTIONAL
Brand: OBTRYX™ II SYSTEM - HALO

## 2021-04-05 RX ORDER — PROPOFOL 10 MG/ML
INJECTION, EMULSION INTRAVENOUS PRN
Status: DISCONTINUED | OUTPATIENT
Start: 2021-04-05 | End: 2021-04-05

## 2021-04-05 RX ORDER — HYDRALAZINE HYDROCHLORIDE 20 MG/ML
5 INJECTION INTRAMUSCULAR; INTRAVENOUS EVERY 10 MIN PRN
Status: DISCONTINUED | OUTPATIENT
Start: 2021-04-05 | End: 2021-04-05 | Stop reason: HOSPADM

## 2021-04-05 RX ORDER — DEXTROSE MONOHYDRATE 25 G/50ML
25 INJECTION, SOLUTION INTRAVENOUS PRN
Status: DISCONTINUED | OUTPATIENT
Start: 2021-04-05 | End: 2021-04-05 | Stop reason: HOSPADM

## 2021-04-05 RX ORDER — SIMETHICONE 125 MG
125 TABLET,CHEWABLE ORAL EVERY 4 HOURS PRN
Status: DISCONTINUED | OUTPATIENT
Start: 2021-04-05 | End: 2021-04-06 | Stop reason: HOSPADM

## 2021-04-05 RX ORDER — IBUPROFEN 600 MG/1
600 TABLET ORAL EVERY 6 HOURS PRN
Qty: 30 TABLET | Refills: 3 | Status: SHIPPED | OUTPATIENT
Start: 2021-04-05

## 2021-04-05 RX ORDER — ACETAMINOPHEN 500 MG
1000 TABLET ORAL
Status: COMPLETED | OUTPATIENT
Start: 2021-04-05 | End: 2021-04-05

## 2021-04-05 RX ORDER — VASOPRESSIN 20 [USP'U]/ML
INJECTION, SOLUTION INTRAMUSCULAR; SUBCUTANEOUS PRN
Status: DISCONTINUED | OUTPATIENT
Start: 2021-04-05 | End: 2021-04-05 | Stop reason: HOSPADM

## 2021-04-05 RX ORDER — ONDANSETRON 2 MG/ML
4 INJECTION INTRAMUSCULAR; INTRAVENOUS EVERY 12 HOURS PRN
Status: DISCONTINUED | OUTPATIENT
Start: 2021-04-05 | End: 2021-04-06 | Stop reason: HOSPADM

## 2021-04-05 RX ORDER — ACETAMINOPHEN 500 MG
1000 TABLET ORAL EVERY 8 HOURS SCHEDULED
Status: DISCONTINUED | OUTPATIENT
Start: 2021-04-05 | End: 2021-04-06 | Stop reason: HOSPADM

## 2021-04-05 RX ORDER — SODIUM CHLORIDE 9 MG/ML
INJECTION, SOLUTION INTRAVENOUS CONTINUOUS
Status: DISCONTINUED | OUTPATIENT
Start: 2021-04-05 | End: 2021-04-05 | Stop reason: HOSPADM

## 2021-04-05 RX ORDER — MIDAZOLAM HYDROCHLORIDE 1 MG/ML
INJECTION, SOLUTION INTRAMUSCULAR; INTRAVENOUS PRN
Status: DISCONTINUED | OUTPATIENT
Start: 2021-04-05 | End: 2021-04-05

## 2021-04-05 RX ORDER — NICOTINE POLACRILEX 4 MG
30 LOZENGE BUCCAL
Status: DISCONTINUED | OUTPATIENT
Start: 2021-04-05 | End: 2021-04-05 | Stop reason: HOSPADM

## 2021-04-05 RX ORDER — ONDANSETRON 2 MG/ML
INJECTION INTRAMUSCULAR; INTRAVENOUS PRN
Status: DISCONTINUED | OUTPATIENT
Start: 2021-04-05 | End: 2021-04-05

## 2021-04-05 RX ORDER — DIPHENHYDRAMINE HYDROCHLORIDE 50 MG/ML
25 INJECTION INTRAMUSCULAR; INTRAVENOUS
Status: DISCONTINUED | OUTPATIENT
Start: 2021-04-05 | End: 2021-04-05 | Stop reason: HOSPADM

## 2021-04-05 RX ORDER — EPHEDRINE SULFATE/0.9% NACL/PF 50 MG/10ML
5 SYRINGE (ML) INTRAVENOUS PRN
Status: DISCONTINUED | OUTPATIENT
Start: 2021-04-05 | End: 2021-04-05 | Stop reason: HOSPADM

## 2021-04-05 RX ORDER — HUMAN INSULIN 100 [IU]/ML
INJECTION, SOLUTION SUBCUTANEOUS
Status: DISCONTINUED | OUTPATIENT
Start: 2021-04-05 | End: 2021-04-05 | Stop reason: HOSPADM

## 2021-04-05 RX ORDER — ALBUTEROL SULFATE 2.5 MG/3ML
2.5 SOLUTION RESPIRATORY (INHALATION)
Status: DISCONTINUED | OUTPATIENT
Start: 2021-04-05 | End: 2021-04-05 | Stop reason: HOSPADM

## 2021-04-05 RX ORDER — ONDANSETRON 4 MG/1
4 TABLET, ORALLY DISINTEGRATING ORAL EVERY 12 HOURS PRN
Status: DISCONTINUED | OUTPATIENT
Start: 2021-04-05 | End: 2021-04-06 | Stop reason: HOSPADM

## 2021-04-05 RX ORDER — CELECOXIB 200 MG/1
400 CAPSULE ORAL
Status: COMPLETED | OUTPATIENT
Start: 2021-04-05 | End: 2021-04-05

## 2021-04-05 RX ORDER — PROCHLORPERAZINE EDISYLATE 5 MG/ML
5 INJECTION INTRAMUSCULAR; INTRAVENOUS
Status: DISCONTINUED | OUTPATIENT
Start: 2021-04-05 | End: 2021-04-05 | Stop reason: HOSPADM

## 2021-04-05 RX ORDER — 0.9 % SODIUM CHLORIDE 0.9 %
VIAL (ML) INJECTION PRN
Status: DISCONTINUED | OUTPATIENT
Start: 2021-04-05 | End: 2021-04-05 | Stop reason: HOSPADM

## 2021-04-05 RX ORDER — HYDROCODONE BITARTRATE AND ACETAMINOPHEN 5; 325 MG/1; MG/1
1 TABLET ORAL EVERY 6 HOURS PRN
Qty: 10 TABLET | Refills: 0 | Status: SHIPPED | OUTPATIENT
Start: 2021-04-05

## 2021-04-05 RX ORDER — 0.9 % SODIUM CHLORIDE 0.9 %
2 VIAL (ML) INJECTION EVERY 12 HOURS SCHEDULED
Status: DISCONTINUED | OUTPATIENT
Start: 2021-04-05 | End: 2021-04-05 | Stop reason: HOSPADM

## 2021-04-05 RX ORDER — BUPIVACAINE HYDROCHLORIDE AND EPINEPHRINE 5; 5 MG/ML; UG/ML
INJECTION, SOLUTION EPIDURAL; INTRACAUDAL; PERINEURAL PRN
Status: DISCONTINUED | OUTPATIENT
Start: 2021-04-05 | End: 2021-04-05 | Stop reason: HOSPADM

## 2021-04-05 RX ORDER — HYDROCODONE BITARTRATE AND ACETAMINOPHEN 10; 325 MG/1; MG/1
1 TABLET ORAL EVERY 4 HOURS PRN
Status: DISCONTINUED | OUTPATIENT
Start: 2021-04-05 | End: 2021-04-06 | Stop reason: HOSPADM

## 2021-04-05 RX ORDER — LIDOCAINE HYDROCHLORIDE 10 MG/ML
5-10 INJECTION, SOLUTION INFILTRATION; PERINEURAL PRN
Status: DISCONTINUED | OUTPATIENT
Start: 2021-04-05 | End: 2021-04-05 | Stop reason: HOSPADM

## 2021-04-05 RX ORDER — SODIUM CHLORIDE, SODIUM LACTATE, POTASSIUM CHLORIDE, CALCIUM CHLORIDE 600; 310; 30; 20 MG/100ML; MG/100ML; MG/100ML; MG/100ML
INJECTION, SOLUTION INTRAVENOUS CONTINUOUS
Status: DISCONTINUED | OUTPATIENT
Start: 2021-04-05 | End: 2021-04-05 | Stop reason: HOSPADM

## 2021-04-05 RX ORDER — NEOSTIGMINE METHYLSULFATE 1 MG/ML
INJECTION, SOLUTION INTRAVENOUS PRN
Status: DISCONTINUED | OUTPATIENT
Start: 2021-04-05 | End: 2021-04-05

## 2021-04-05 RX ORDER — CHLORHEXIDINE GLUCONATE ORAL RINSE 1.2 MG/ML
15 SOLUTION DENTAL
Status: COMPLETED | OUTPATIENT
Start: 2021-04-05 | End: 2021-04-05

## 2021-04-05 RX ORDER — CHLORHEXIDINE GLUCONATE ORAL RINSE 1.2 MG/ML
15 SOLUTION DENTAL EVERY 12 HOURS SCHEDULED
Status: DISCONTINUED | OUTPATIENT
Start: 2021-04-05 | End: 2021-04-06 | Stop reason: HOSPADM

## 2021-04-05 RX ORDER — HYDROCODONE BITARTRATE AND ACETAMINOPHEN 5; 325 MG/1; MG/1
1 TABLET ORAL EVERY 4 HOURS PRN
Status: DISCONTINUED | OUTPATIENT
Start: 2021-04-05 | End: 2021-04-06 | Stop reason: HOSPADM

## 2021-04-05 RX ORDER — DEXTROSE MONOHYDRATE 50 MG/ML
INJECTION, SOLUTION INTRAVENOUS CONTINUOUS PRN
Status: DISCONTINUED | OUTPATIENT
Start: 2021-04-05 | End: 2021-04-05

## 2021-04-05 RX ORDER — ROCURONIUM BROMIDE 10 MG/ML
INJECTION, SOLUTION INTRAVENOUS PRN
Status: DISCONTINUED | OUTPATIENT
Start: 2021-04-05 | End: 2021-04-05

## 2021-04-05 RX ORDER — NALBUPHINE HYDROCHLORIDE 10 MG/ML
2.5 INJECTION, SOLUTION INTRAMUSCULAR; INTRAVENOUS; SUBCUTANEOUS
Status: DISCONTINUED | OUTPATIENT
Start: 2021-04-05 | End: 2021-04-05 | Stop reason: HOSPADM

## 2021-04-05 RX ORDER — LIDOCAINE HYDROCHLORIDE 20 MG/ML
INJECTION, SOLUTION INFILTRATION; PERINEURAL PRN
Status: DISCONTINUED | OUTPATIENT
Start: 2021-04-05 | End: 2021-04-05

## 2021-04-05 RX ORDER — SUMATRIPTAN 50 MG/1
100 TABLET, FILM COATED ORAL PRN
Status: DISCONTINUED | OUTPATIENT
Start: 2021-04-05 | End: 2021-04-06 | Stop reason: HOSPADM

## 2021-04-05 RX ORDER — ONDANSETRON 2 MG/ML
4 INJECTION INTRAMUSCULAR; INTRAVENOUS
Status: DISCONTINUED | OUTPATIENT
Start: 2021-04-05 | End: 2021-04-05 | Stop reason: HOSPADM

## 2021-04-05 RX ORDER — GLYCOPYRROLATE 0.2 MG/ML
INJECTION, SOLUTION INTRAMUSCULAR; INTRAVENOUS PRN
Status: DISCONTINUED | OUTPATIENT
Start: 2021-04-05 | End: 2021-04-05

## 2021-04-05 RX ORDER — GABAPENTIN 300 MG/1
300 CAPSULE ORAL EVERY 8 HOURS SCHEDULED
Status: DISCONTINUED | OUTPATIENT
Start: 2021-04-05 | End: 2021-04-06 | Stop reason: HOSPADM

## 2021-04-05 RX ORDER — GABAPENTIN 300 MG/1
600 CAPSULE ORAL
Status: COMPLETED | OUTPATIENT
Start: 2021-04-05 | End: 2021-04-05

## 2021-04-05 RX ORDER — ENOXAPARIN SODIUM 100 MG/ML
40 INJECTION SUBCUTANEOUS DAILY
Status: DISCONTINUED | OUTPATIENT
Start: 2021-04-05 | End: 2021-04-06 | Stop reason: HOSPADM

## 2021-04-05 RX ORDER — DEXAMETHASONE SODIUM PHOSPHATE 4 MG/ML
INJECTION, SOLUTION INTRA-ARTICULAR; INTRALESIONAL; INTRAMUSCULAR; INTRAVENOUS; SOFT TISSUE PRN
Status: DISCONTINUED | OUTPATIENT
Start: 2021-04-05 | End: 2021-04-05

## 2021-04-05 RX ADMIN — SODIUM CHLORIDE, POTASSIUM CHLORIDE, SODIUM LACTATE AND CALCIUM CHLORIDE: 600; 310; 30; 20 INJECTION, SOLUTION INTRAVENOUS at 09:40

## 2021-04-05 RX ADMIN — GLYCOPYRROLATE 0.6 MG: 0.2 INJECTION, SOLUTION INTRAMUSCULAR; INTRAVENOUS at 09:39

## 2021-04-05 RX ADMIN — ENOXAPARIN SODIUM 40 MG: 40 INJECTION SUBCUTANEOUS at 14:27

## 2021-04-05 RX ADMIN — ONDANSETRON 4 MG: 2 INJECTION INTRAMUSCULAR; INTRAVENOUS at 18:09

## 2021-04-05 RX ADMIN — GABAPENTIN 600 MG: 300 CAPSULE ORAL at 06:47

## 2021-04-05 RX ADMIN — SODIUM CHLORIDE, POTASSIUM CHLORIDE, SODIUM LACTATE AND CALCIUM CHLORIDE: 600; 310; 30; 20 INJECTION, SOLUTION INTRAVENOUS at 06:46

## 2021-04-05 RX ADMIN — Medication 70 MG: at 07:47

## 2021-04-05 RX ADMIN — GABAPENTIN 300 MG: 300 CAPSULE ORAL at 14:27

## 2021-04-05 RX ADMIN — FENTANYL CITRATE 50 MCG: 50 INJECTION, SOLUTION INTRAMUSCULAR; INTRAVENOUS at 07:47

## 2021-04-05 RX ADMIN — Medication 35 MG: at 07:51

## 2021-04-05 RX ADMIN — CHLORHEXIDINE GLUCONATE 15 ML: 1.2 RINSE ORAL at 20:49

## 2021-04-05 RX ADMIN — ROCURONIUM BROMIDE 5 MG: 50 INJECTION, SOLUTION INTRAVENOUS at 07:47

## 2021-04-05 RX ADMIN — MIDAZOLAM HYDROCHLORIDE 2 MG: 1 INJECTION, SOLUTION INTRAMUSCULAR; INTRAVENOUS at 07:44

## 2021-04-05 RX ADMIN — CEFAZOLIN SODIUM 2000 MG: 300 INJECTION, POWDER, LYOPHILIZED, FOR SOLUTION INTRAVENOUS at 07:53

## 2021-04-05 RX ADMIN — FENTANYL CITRATE 25 MCG: 50 INJECTION, SOLUTION INTRAMUSCULAR; INTRAVENOUS at 09:48

## 2021-04-05 RX ADMIN — GABAPENTIN 300 MG: 300 CAPSULE ORAL at 22:17

## 2021-04-05 RX ADMIN — NEOSTIGMINE METHYLSULFATE 3 MG: 1 INJECTION INTRAVENOUS at 09:39

## 2021-04-05 RX ADMIN — DEXAMETHASONE SODIUM PHOSPHATE 4 MG: 4 INJECTION, SOLUTION INTRAMUSCULAR; INTRAVENOUS at 07:55

## 2021-04-05 RX ADMIN — ONDANSETRON 4 MG: 2 INJECTION INTRAMUSCULAR; INTRAVENOUS at 09:40

## 2021-04-05 RX ADMIN — FENTANYL CITRATE 25 MCG: 50 INJECTION, SOLUTION INTRAMUSCULAR; INTRAVENOUS at 10:21

## 2021-04-05 RX ADMIN — ACETAMINOPHEN 1000 MG: 500 TABLET ORAL at 06:46

## 2021-04-05 RX ADMIN — LIDOCAINE HYDROCHLORIDE 4.5 ML: 20 INJECTION, SOLUTION INFILTRATION; PERINEURAL at 07:47

## 2021-04-05 RX ADMIN — ACETAMINOPHEN 1000 MG: 500 TABLET ORAL at 14:27

## 2021-04-05 RX ADMIN — PROPOFOL 150 MG: 10 INJECTION, EMULSION INTRAVENOUS at 07:47

## 2021-04-05 RX ADMIN — ACETAMINOPHEN 1000 MG: 500 TABLET ORAL at 22:17

## 2021-04-05 RX ADMIN — CELECOXIB 400 MG: 200 CAPSULE ORAL at 06:47

## 2021-04-05 RX ADMIN — Medication 30 MG: at 09:41

## 2021-04-05 RX ADMIN — CHLORHEXIDINE GLUCONATE 15 ML: 1.2 RINSE ORAL at 06:47

## 2021-04-05 ASSESSMENT — ACTIVITIES OF DAILY LIVING (ADL)
CHRONIC_PAIN_PRESENT: NO
ADL_BEFORE_ADMISSION: INDEPENDENT
ADL_SCORE: 12
ADL_SHORT_OF_BREATH: NO
RECENT_DECLINE_ADL: NO

## 2021-04-05 ASSESSMENT — PAIN SCALES - GENERAL
PAINLEVEL_OUTOF10: 0
PAINLEVEL_OUTOF10: 0
PAINLEVEL_OUTOF10: 6
PAINLEVEL_OUTOF10: 0
PAINLEVEL_OUTOF10: 2
PAINLEVEL_OUTOF10: 0

## 2021-04-05 ASSESSMENT — LIFESTYLE VARIABLES
HOW MANY STANDARD DRINKS CONTAINING ALCOHOL DO YOU HAVE ON A TYPICAL DAY: 0,1 OR 2
ALCOHOL_USE_STATUS: NO OR LOW RISK WITH VALIDATED TOOL
HOW OFTEN DO YOU HAVE A DRINK CONTAINING ALCOHOL: NEVER
HOW OFTEN DO YOU HAVE 6 OR MORE DRINKS ON ONE OCCASION: NEVER
AUDIT-C TOTAL SCORE: 0

## 2021-04-05 ASSESSMENT — ENCOUNTER SYMPTOMS
EXERCISE TOLERANCE: GOOD (>4 METS)
HEADACHES: 1

## 2021-04-05 ASSESSMENT — COGNITIVE AND FUNCTIONAL STATUS - GENERAL
BECAUSE OF A PHYSICAL, MENTAL, OR EMOTIONAL CONDITION, DO YOU HAVE SERIOUS DIFFICULTY CONCENTRATING, REMEMBERING OR MAKING DECISIONS: NO
BECAUSE OF A PHYSICAL, MENTAL, OR EMOTIONAL CONDITION, DO YOU HAVE DIFFICULTY DOING ERRANDS ALONE: NO
DO YOU HAVE SERIOUS DIFFICULTY WALKING OR CLIMBING STAIRS: NO
ARE YOU DEAF OR DO YOU HAVE SERIOUS DIFFICULTY  HEARING: NO
DO YOU HAVE DIFFICULTY DRESSING OR BATHING: NO
ARE YOU BLIND OR DO YOU HAVE SERIOUS DIFFICULTY SEEING, EVEN WHEN WEARING GLASSES: NO

## 2021-04-05 ASSESSMENT — PATIENT HEALTH QUESTIONNAIRE - PHQ9
CLINICAL INTERPRETATION OF PHQ9 SCORE: NO FURTHER SCREENING NEEDED
IS PATIENT ABLE TO COMPLETE PHQ2 OR PHQ9: YES
SUM OF ALL RESPONSES TO PHQ9 QUESTIONS 1 AND 2: 0
CLINICAL INTERPRETATION OF PHQ2 SCORE: NO FURTHER SCREENING NEEDED

## 2021-04-06 VITALS
RESPIRATION RATE: 18 BRPM | HEART RATE: 75 BPM | SYSTOLIC BLOOD PRESSURE: 93 MMHG | TEMPERATURE: 98.2 F | OXYGEN SATURATION: 98 % | WEIGHT: 138.89 LBS | DIASTOLIC BLOOD PRESSURE: 53 MMHG | BODY MASS INDEX: 25.56 KG/M2 | HEIGHT: 62 IN

## 2021-04-06 LAB
BASOPHILS # BLD: 0 K/MCL (ref 0–0.3)
BASOPHILS NFR BLD: 0 %
DEPRECATED RDW RBC: 41.8 FL (ref 39–50)
EOSINOPHIL # BLD: 0.1 K/MCL (ref 0–0.5)
EOSINOPHIL NFR BLD: 0 %
ERYTHROCYTE [DISTWIDTH] IN BLOOD: 12 % (ref 11–15)
HCT VFR BLD CALC: 31.7 % (ref 36–46.5)
HGB BLD-MCNC: 10.6 G/DL (ref 12–15.5)
IMM GRANULOCYTES # BLD AUTO: 0.1 K/MCL (ref 0–0.2)
IMM GRANULOCYTES # BLD: 1 %
LYMPHOCYTES # BLD: 2.2 K/MCL (ref 1–4.8)
LYMPHOCYTES NFR BLD: 18 %
MCH RBC QN AUTO: 31.5 PG (ref 26–34)
MCHC RBC AUTO-ENTMCNC: 33.4 G/DL (ref 32–36.5)
MCV RBC AUTO: 94.3 FL (ref 78–100)
MONOCYTES # BLD: 1 K/MCL (ref 0.3–0.9)
MONOCYTES NFR BLD: 8 %
NEUTROPHILS # BLD: 9.2 K/MCL (ref 1.8–7.7)
NEUTROPHILS NFR BLD: 73 %
NRBC BLD MANUAL-RTO: 0 /100 WBC
PLATELET # BLD AUTO: 215 K/MCL (ref 140–450)
RAINBOW EXTRA TUBES HOLD SPECIMEN: NORMAL
RAINBOW EXTRA TUBES HOLD SPECIMEN: NORMAL
RBC # BLD: 3.36 MIL/MCL (ref 4–5.2)
WBC # BLD: 12.6 K/MCL (ref 4.2–11)

## 2021-04-06 PROCEDURE — 51798 US URINE CAPACITY MEASURE: CPT

## 2021-04-06 PROCEDURE — G0378 HOSPITAL OBSERVATION PER HR: HCPCS

## 2021-04-06 PROCEDURE — 36415 COLL VENOUS BLD VENIPUNCTURE: CPT | Performed by: OBSTETRICS & GYNECOLOGY

## 2021-04-06 PROCEDURE — 10004651 HB RX, NO CHARGE ITEM: Performed by: OBSTETRICS & GYNECOLOGY

## 2021-04-06 PROCEDURE — 85025 COMPLETE CBC W/AUTO DIFF WBC: CPT | Performed by: OBSTETRICS & GYNECOLOGY

## 2021-04-06 PROCEDURE — 96372 THER/PROPH/DIAG INJ SC/IM: CPT

## 2021-04-06 PROCEDURE — 10002803 HB RX 637: Performed by: OBSTETRICS & GYNECOLOGY

## 2021-04-06 PROCEDURE — 10002800 HB RX 250 W HCPCS: Performed by: OBSTETRICS & GYNECOLOGY

## 2021-04-06 RX ADMIN — GABAPENTIN 300 MG: 300 CAPSULE ORAL at 06:08

## 2021-04-06 RX ADMIN — SUMATRIPTAN SUCCINATE 50 MG: 50 TABLET ORAL at 10:07

## 2021-04-06 RX ADMIN — CHLORHEXIDINE GLUCONATE 15 ML: 1.2 RINSE ORAL at 10:10

## 2021-04-06 RX ADMIN — ENOXAPARIN SODIUM 40 MG: 40 INJECTION SUBCUTANEOUS at 08:37

## 2021-04-06 RX ADMIN — ACETAMINOPHEN 1000 MG: 500 TABLET ORAL at 06:08

## 2021-04-06 ASSESSMENT — PAIN SCALES - GENERAL
PAINLEVEL_OUTOF10: 1
PAINLEVEL_OUTOF10: 0

## 2021-04-07 LAB
ASR DISCLAIMER: NORMAL
CASE RPRT: NORMAL
CLINICAL INFO: NORMAL
PATH REPORT.FINAL DX SPEC: NORMAL
PATH REPORT.GROSS SPEC: NORMAL

## 2021-07-16 DIAGNOSIS — G43.709 CHRONIC MIGRAINE WITHOUT AURA WITHOUT STATUS MIGRAINOSUS, NOT INTRACTABLE: Primary | ICD-10-CM

## 2021-07-17 RX ORDER — SUMATRIPTAN 100 MG/1
TABLET, FILM COATED ORAL
Qty: 9 TABLET | Refills: 27 | Status: SHIPPED | OUTPATIENT
Start: 2021-07-17

## 2021-07-17 NOTE — TELEPHONE ENCOUNTER
Please advise refill sumatriptan succinate refill as requested by [facility]?      Future appt:     Last Appointment with provider:   3/30/2021 for Preop exam, annual px on 12/17/20,Return in about 1 year (around 12/17/2021)     Last appointment at [facility]

## 2021-08-09 NOTE — TELEPHONE ENCOUNTER
Pt called back, informed. Erythromycin Pregnancy And Lactation Text: This medication is Pregnancy Category B and is considered safe during pregnancy. It is also excreted in breast milk.

## 2021-10-22 ENCOUNTER — TELEPHONE (OUTPATIENT)
Dept: FAMILY MEDICINE CLINIC | Facility: CLINIC | Age: 46
End: 2021-10-22

## 2021-10-22 NOTE — TELEPHONE ENCOUNTER
c/o UTI     now with specks of blood in urine  ( called Uro and they are unable to see her until Tuesday )     please advise       No future appointments.

## 2021-10-22 NOTE — TELEPHONE ENCOUNTER
uro- stefani contreras in [address]- had a hyst. In April has bladder sling now. We do not have any appts avail. So pt is just going to see urology Tuesday. Pt states that if fine.     Nothing further

## 2021-10-22 NOTE — TELEPHONE ENCOUNTER
----- Message from Beatriz Hollingsworth sent at 10/22/2021 11:00 AM CDT -----  [phone].  Returned your call

## 2021-11-19 ENCOUNTER — HOSPITAL ENCOUNTER (OUTPATIENT)
Dept: ULTRASOUND IMAGING | Age: 46
Discharge: HOME | End: 2021-11-19
Attending: UROLOGY

## 2021-11-19 DIAGNOSIS — N36.41 URETHRAL HYPERMOBILITY: ICD-10-CM

## 2021-11-19 PROCEDURE — 76775 US EXAM ABDO BACK WALL LIM: CPT

## 2022-02-03 ENCOUNTER — HOSPITAL ENCOUNTER (OUTPATIENT)
Dept: MAMMOGRAPHY | Age: 47
Discharge: HOME | End: 2022-02-03
Attending: OBSTETRICS & GYNECOLOGY

## 2022-02-03 ENCOUNTER — HOSPITAL ENCOUNTER (OUTPATIENT)
Dept: ULTRASOUND IMAGING | Age: 47
Discharge: HOME | End: 2022-02-03
Attending: OBSTETRICS & GYNECOLOGY

## 2022-02-03 DIAGNOSIS — R92.30 DENSE BREASTS: ICD-10-CM

## 2022-02-03 DIAGNOSIS — R92.2 DENSE BREASTS: ICD-10-CM

## 2022-02-03 DIAGNOSIS — Z12.31 ENCOUNTER FOR SCREENING MAMMOGRAM FOR MALIGNANT NEOPLASM OF BREAST: ICD-10-CM

## 2022-02-03 PROCEDURE — 76641 ULTRASOUND BREAST COMPLETE: CPT

## 2022-02-03 PROCEDURE — 77063 BREAST TOMOSYNTHESIS BI: CPT

## 2022-02-16 ENCOUNTER — HOSPITAL ENCOUNTER (OUTPATIENT)
Dept: ULTRASOUND IMAGING | Age: 47
Discharge: HOME | End: 2022-02-16
Attending: OBSTETRICS & GYNECOLOGY

## 2022-02-16 DIAGNOSIS — R92.8 ABNORMAL MAMMOGRAM: ICD-10-CM

## 2022-02-16 PROCEDURE — 76641 ULTRASOUND BREAST COMPLETE: CPT

## 2022-07-21 DIAGNOSIS — G43.709 CHRONIC MIGRAINE WITHOUT AURA WITHOUT STATUS MIGRAINOSUS, NOT INTRACTABLE: ICD-10-CM

## 2022-07-21 NOTE — TELEPHONE ENCOUNTER
Future appt:    Last Appointment with provider:  3/30/2021    Last appointment at [facility]:  Visit date not found  CHOLESTEROL, TOTAL (mg/dL)   Date Value   01/08/2021 178     HDL CHOLESTEROL (mg/dL)   Date Value   01/08/2021 56     LDL-CHOLESTEROL (mg/dL (calc))   Date Value   01/08/2021 107 (H)     TRIGLYCERIDES (mg/dL)   Date Value   01/08/2021 66     Lab Results   Component Value Date    A1C 4.9 01/08/2021     Lab Results   Component Value Date    T4F 1.1 12/17/2018    TSH 0.754 12/17/2018    TSHT4 0.77 01/08/2021     Last RF:  7/17/2021    No follow-ups on file.

## 2022-07-25 RX ORDER — SUMATRIPTAN 100 MG/1
TABLET, FILM COATED ORAL
Qty: 9 TABLET | Refills: 0 | Status: SHIPPED | OUTPATIENT
Start: 2022-07-25

## 2022-07-25 NOTE — TELEPHONE ENCOUNTER
pt made appt for px on 8/10- please send meds to pharmacy  Future Appointments   Date Time Provider Ashleigh Mariana   8/10/2022  3:30 PM Dean Clifton MD [facility]

## 2022-08-10 ENCOUNTER — OFFICE VISIT (OUTPATIENT)
Dept: FAMILY MEDICINE CLINIC | Facility: CLINIC | Age: 47
End: 2022-08-10
Payer: COMMERCIAL

## 2022-08-10 VITALS
WEIGHT: 137.63 LBS | HEART RATE: 67 BPM | TEMPERATURE: 97 F | SYSTOLIC BLOOD PRESSURE: 108 MMHG | DIASTOLIC BLOOD PRESSURE: 62 MMHG | RESPIRATION RATE: 18 BRPM | BODY MASS INDEX: 25.01 KG/M2 | HEIGHT: 62.25 IN | OXYGEN SATURATION: 98 %

## 2022-08-10 DIAGNOSIS — Z00.00 PHYSICAL EXAM: Primary | ICD-10-CM

## 2022-08-10 DIAGNOSIS — G43.709 CHRONIC MIGRAINE WITHOUT AURA WITHOUT STATUS MIGRAINOSUS, NOT INTRACTABLE: ICD-10-CM

## 2022-08-10 PROCEDURE — 3074F SYST BP LT 130 MM HG: CPT | Performed by: FAMILY MEDICINE

## 2022-08-10 PROCEDURE — 3078F DIAST BP <80 MM HG: CPT | Performed by: FAMILY MEDICINE

## 2022-08-10 PROCEDURE — 99396 PREV VISIT EST AGE 40-64: CPT | Performed by: FAMILY MEDICINE

## 2022-08-10 PROCEDURE — 3008F BODY MASS INDEX DOCD: CPT | Performed by: FAMILY MEDICINE

## 2022-08-10 RX ORDER — SUMATRIPTAN 100 MG/1
100 TABLET, FILM COATED ORAL AS NEEDED
Qty: 30 TABLET | Refills: 2 | Status: SHIPPED | OUTPATIENT
Start: 2022-08-10

## 2022-08-10 NOTE — PATIENT INSTRUCTIONS
Continue current medications  Migraine stable with medication. Schedule labs. Recommend colonoscopy. Recommend mammogram.  See gynecologist for Pap smear.

## 2022-09-13 ENCOUNTER — LAB ENCOUNTER (OUTPATIENT)
Dept: LAB | Age: 47
End: 2022-09-13
Attending: FAMILY MEDICINE
Payer: COMMERCIAL

## 2022-09-13 DIAGNOSIS — Z00.00 PHYSICAL EXAM: ICD-10-CM

## 2022-09-13 LAB
ALBUMIN SERPL-MCNC: 4.1 G/DL (ref 3.4–5)
ALBUMIN/GLOB SERPL: 1.4 {RATIO} (ref 1–2)
ALP LIVER SERPL-CCNC: 53 U/L
ALT SERPL-CCNC: 35 U/L
ANION GAP SERPL CALC-SCNC: 6 MMOL/L (ref 0–18)
AST SERPL-CCNC: 18 U/L (ref 15–37)
BASOPHILS # BLD AUTO: 0.05 X10(3) UL (ref 0–0.2)
BASOPHILS NFR BLD AUTO: 1 %
BILIRUB SERPL-MCNC: 0.5 MG/DL (ref 0.1–2)
BILIRUB UR QL STRIP.AUTO: NEGATIVE
BUN BLD-MCNC: 23 MG/DL (ref 7–18)
CALCIUM BLD-MCNC: 9.3 MG/DL (ref 8.5–10.1)
CHLORIDE SERPL-SCNC: 108 MMOL/L (ref 98–112)
CHOLEST SERPL-MCNC: 165 MG/DL (ref ?–200)
CLARITY UR REFRACT.AUTO: CLEAR
CO2 SERPL-SCNC: 25 MMOL/L (ref 21–32)
COLOR UR AUTO: YELLOW
CREAT BLD-MCNC: 0.64 MG/DL
EOSINOPHIL # BLD AUTO: 0.24 X10(3) UL (ref 0–0.7)
EOSINOPHIL NFR BLD AUTO: 4.6 %
ERYTHROCYTE [DISTWIDTH] IN BLOOD BY AUTOMATED COUNT: 12.1 %
EST. AVERAGE GLUCOSE BLD GHB EST-MCNC: 108 MG/DL (ref 68–126)
FASTING PATIENT LIPID ANSWER: YES
FASTING STATUS PATIENT QL REPORTED: YES
GFR SERPLBLD BASED ON 1.73 SQ M-ARVRAT: 110 ML/MIN/1.73M2 (ref 60–?)
GLOBULIN PLAS-MCNC: 3 G/DL (ref 2.8–4.4)
GLUCOSE BLD-MCNC: 88 MG/DL (ref 70–99)
GLUCOSE UR STRIP.AUTO-MCNC: NEGATIVE MG/DL
HBA1C MFR BLD: 5.4 % (ref ?–5.7)
HCT VFR BLD AUTO: 39.3 %
HDLC SERPL-MCNC: 58 MG/DL (ref 40–59)
HGB BLD-MCNC: 13 G/DL
IMM GRANULOCYTES # BLD AUTO: 0.01 X10(3) UL (ref 0–1)
IMM GRANULOCYTES NFR BLD: 0.2 %
KETONES UR STRIP.AUTO-MCNC: NEGATIVE MG/DL
LDLC SERPL CALC-MCNC: 97 MG/DL (ref ?–100)
LEUKOCYTE ESTERASE UR QL STRIP.AUTO: NEGATIVE
LYMPHOCYTES # BLD AUTO: 1.79 X10(3) UL (ref 1–4)
LYMPHOCYTES NFR BLD AUTO: 34 %
MCH RBC QN AUTO: 32.7 PG (ref 26–34)
MCHC RBC AUTO-ENTMCNC: 33.1 G/DL (ref 31–37)
MCV RBC AUTO: 98.7 FL
MONOCYTES # BLD AUTO: 0.46 X10(3) UL (ref 0.1–1)
MONOCYTES NFR BLD AUTO: 8.7 %
NEUTROPHILS # BLD AUTO: 2.71 X10 (3) UL (ref 1.5–7.7)
NEUTROPHILS # BLD AUTO: 2.71 X10(3) UL (ref 1.5–7.7)
NEUTROPHILS NFR BLD AUTO: 51.5 %
NITRITE UR QL STRIP.AUTO: NEGATIVE
NONHDLC SERPL-MCNC: 107 MG/DL (ref ?–130)
OSMOLALITY SERPL CALC.SUM OF ELEC: 291 MOSM/KG (ref 275–295)
PH UR STRIP.AUTO: 6.5 [PH] (ref 5–8)
PLATELET # BLD AUTO: 249 10(3)UL (ref 150–450)
POTASSIUM SERPL-SCNC: 3.8 MMOL/L (ref 3.5–5.1)
PROT SERPL-MCNC: 7.1 G/DL (ref 6.4–8.2)
PROT UR STRIP.AUTO-MCNC: NEGATIVE MG/DL
RBC # BLD AUTO: 3.98 X10(6)UL
RBC UR QL AUTO: NEGATIVE
SODIUM SERPL-SCNC: 139 MMOL/L (ref 136–145)
SP GR UR STRIP.AUTO: 1.01 (ref 1–1.03)
TRIGL SERPL-MCNC: 46 MG/DL (ref 30–149)
TSI SER-ACNC: 0.82 MIU/ML (ref 0.36–3.74)
UROBILINOGEN UR STRIP.AUTO-MCNC: 0.2 MG/DL
VLDLC SERPL CALC-MCNC: 8 MG/DL (ref 0–30)
WBC # BLD AUTO: 5.3 X10(3) UL (ref 4–11)

## 2022-09-13 PROCEDURE — 80061 LIPID PANEL: CPT | Performed by: FAMILY MEDICINE

## 2022-09-13 PROCEDURE — 83036 HEMOGLOBIN GLYCOSYLATED A1C: CPT | Performed by: FAMILY MEDICINE

## 2022-09-13 PROCEDURE — 81003 URINALYSIS AUTO W/O SCOPE: CPT | Performed by: FAMILY MEDICINE

## 2022-09-13 PROCEDURE — 80050 GENERAL HEALTH PANEL: CPT | Performed by: FAMILY MEDICINE

## 2022-10-19 ENCOUNTER — HOSPITAL ENCOUNTER (OUTPATIENT)
Dept: ULTRASOUND IMAGING | Age: 47
Discharge: HOME | End: 2022-10-19
Attending: OBSTETRICS & GYNECOLOGY

## 2022-10-19 DIAGNOSIS — R92.8 ABNORMAL FINDINGS ON DIAGNOSTIC IMAGING OF BREAST: ICD-10-CM

## 2022-10-19 PROCEDURE — 76641 ULTRASOUND BREAST COMPLETE: CPT

## 2023-03-16 DIAGNOSIS — G43.709 CHRONIC MIGRAINE WITHOUT AURA WITHOUT STATUS MIGRAINOSUS, NOT INTRACTABLE: ICD-10-CM

## 2023-03-16 RX ORDER — SUMATRIPTAN 100 MG/1
TABLET, FILM COATED ORAL
Qty: 27 TABLET | Refills: 8 | Status: SHIPPED | OUTPATIENT
Start: 2023-03-16

## 2023-03-16 NOTE — TELEPHONE ENCOUNTER
Return in about 1 year (around 8/10/2023) for physical    Future appt:    Last Appointment with provider:   Visit date not found  Last appointment at [facility]:  Visit date not found  Cholesterol, Total (mg/dL)   Date Value   09/13/2022 165     CHOLESTEROL, TOTAL (mg/dL)   Date Value   01/08/2021 178     HDL Cholesterol (mg/dL)   Date Value   09/13/2022 58     HDL CHOLESTEROL (mg/dL)   Date Value   01/08/2021 56     LDL Cholesterol (mg/dL)   Date Value   09/13/2022 97     LDL-CHOLESTEROL (mg/dL (calc))   Date Value   01/08/2021 107 (H)     Triglycerides (mg/dL)   Date Value   09/13/2022 46     TRIGLYCERIDES (mg/dL)   Date Value   01/08/2021 66     Lab Results   Component Value Date     09/13/2022    A1C 5.4 09/13/2022     Lab Results   Component Value Date    T4F 1.1 12/17/2018    TSH 0.821 09/13/2022    TSHT4 0.77 01/08/2021       No follow-ups on file.

## 2023-07-18 DIAGNOSIS — R92.2 INCONCLUSIVE MAMMOGRAM: ICD-10-CM

## 2023-07-18 DIAGNOSIS — Z12.31 ENCOUNTER FOR SCREENING MAMMOGRAM FOR MALIGNANT NEOPLASM OF BREAST: Primary | ICD-10-CM

## 2023-07-26 ENCOUNTER — IMAGING SERVICES (OUTPATIENT)
Dept: MAMMOGRAPHY | Age: 48
End: 2023-07-26

## 2023-07-26 DIAGNOSIS — Z12.31 ENCOUNTER FOR SCREENING MAMMOGRAM FOR MALIGNANT NEOPLASM OF BREAST: ICD-10-CM

## 2023-08-28 ENCOUNTER — APPOINTMENT (OUTPATIENT)
Dept: ULTRASOUND IMAGING | Age: 48
End: 2023-08-28
Attending: OBSTETRICS & GYNECOLOGY

## 2023-08-28 ENCOUNTER — APPOINTMENT (OUTPATIENT)
Dept: MAMMOGRAPHY | Age: 48
End: 2023-08-28
Attending: OBSTETRICS & GYNECOLOGY

## 2023-08-29 DIAGNOSIS — R92.8 OTHER ABNORMAL AND INCONCLUSIVE FINDINGS ON DIAGNOSTIC IMAGING OF BREAST: Primary | ICD-10-CM

## 2023-12-05 ENCOUNTER — APPOINTMENT (OUTPATIENT)
Dept: ULTRASOUND IMAGING | Age: 48
End: 2023-12-05
Attending: OBSTETRICS & GYNECOLOGY

## 2023-12-05 ENCOUNTER — APPOINTMENT (OUTPATIENT)
Dept: MAMMOGRAPHY | Age: 48
End: 2023-12-05
Attending: OBSTETRICS & GYNECOLOGY

## 2024-01-24 ENCOUNTER — HOSPITAL ENCOUNTER (OUTPATIENT)
Dept: ULTRASOUND IMAGING | Age: 49
Discharge: HOME | End: 2024-01-24
Attending: OBSTETRICS & GYNECOLOGY

## 2024-01-24 ENCOUNTER — HOSPITAL ENCOUNTER (OUTPATIENT)
Dept: MAMMOGRAPHY | Age: 49
Discharge: HOME | End: 2024-01-24
Attending: OBSTETRICS & GYNECOLOGY

## 2024-01-24 DIAGNOSIS — R92.8 OTHER ABNORMAL AND INCONCLUSIVE FINDINGS ON DIAGNOSTIC IMAGING OF BREAST: ICD-10-CM

## 2024-01-24 DIAGNOSIS — R92.8 OTHER ABNORMAL AND INCONCLUSIVE FINDINGS ON DIAGNOSTIC IMAGING OF BREAST: Primary | ICD-10-CM

## 2024-01-24 PROCEDURE — 76641 ULTRASOUND BREAST COMPLETE: CPT

## 2024-01-24 PROCEDURE — G0279 TOMOSYNTHESIS, MAMMO: HCPCS

## 2024-07-26 ENCOUNTER — APPOINTMENT (OUTPATIENT)
Dept: ULTRASOUND IMAGING | Age: 49
End: 2024-07-26
Attending: OBSTETRICS & GYNECOLOGY

## 2024-07-31 ENCOUNTER — HOSPITAL ENCOUNTER (OUTPATIENT)
Dept: ULTRASOUND IMAGING | Age: 49
Discharge: HOME | End: 2024-07-31
Attending: OBSTETRICS & GYNECOLOGY

## 2024-07-31 DIAGNOSIS — R92.8 OTHER ABNORMAL AND INCONCLUSIVE FINDINGS ON DIAGNOSTIC IMAGING OF BREAST: ICD-10-CM

## 2024-07-31 PROCEDURE — 76642 ULTRASOUND BREAST LIMITED: CPT

## 2024-12-17 DIAGNOSIS — Z12.31 VISIT FOR SCREENING MAMMOGRAM: Primary | ICD-10-CM

## 2025-01-15 ENCOUNTER — HOSPITAL ENCOUNTER (OUTPATIENT)
Dept: MAMMOGRAPHY | Age: 50
Discharge: HOME | End: 2025-01-15
Attending: OBSTETRICS & GYNECOLOGY

## 2025-01-15 DIAGNOSIS — Z12.31 VISIT FOR SCREENING MAMMOGRAM: ICD-10-CM

## 2025-01-15 PROCEDURE — 77067 SCR MAMMO BI INCL CAD: CPT

## 2025-01-24 DIAGNOSIS — Z12.31 ENCOUNTER FOR SCREENING MAMMOGRAM FOR MALIGNANT NEOPLASM OF BREAST: Primary | ICD-10-CM

## (undated) DEVICE — Device

## (undated) DEVICE — LAWSON - CONTAINER SPCMN 4OZ STL

## (undated) DEVICE — LAWSON - TROCAR V2 BLDLS OPTCL 5MM STD

## (undated) DEVICE — LAWSON - SUT VICP 0 AV CT1 VCP340H

## (undated) DEVICE — LAWSON - PLUG FOLEY CATH STL W/CAP

## (undated) DEVICE — LAWSON - CANNULA UNIV STD FIX 5MM

## (undated) DEVICE — LAWSON - BAG URIN MTR 350ML

## (undated) DEVICE — LAWSON - SUT VICP 2-0 VCP333H